# Patient Record
(demographics unavailable — no encounter records)

---

## 2025-02-10 NOTE — PHYSICAL EXAM
[Alert] : alert [No Acute Distress] : in no acute distress [Sclera] : the sclera and conjunctiva were normal [EOMI] : extraocular movements were intact [Normal Outer Ear/Nose] : the ears and nose were normal in appearance [Normal Appearance] : the appearance of the neck was normal [Supple] : the neck was supple [No Respiratory Distress] : no respiratory distress [No Acc Muscle Use] : no accessory muscle use [Respiration, Rhythm And Depth] : normal respiratory rhythm and effort [Auscultation Breath Sounds / Voice Sounds] : lungs were clear to auscultation bilaterally [Normal S1, S2] : normal S1 and S2 [Heart Rate And Rhythm] : heart rate was normal and rhythm regular [Edema] : edema was not present [Bowel Sounds] : normal bowel sounds [Abdomen Tenderness] : non-tender [Abdomen Soft] : soft [No Spinal Tenderness] : no spinal tenderness [Normal Gait] : normal gait [Involuntary Movements] : no involuntary movements were seen [Motor Tone] : muscle strength and tone were normal [Normal Color / Pigmentation] : normal skin color and pigmentation [Normal Turgor] : normal skin turgor [No Focal Deficits] : no focal deficits [Normal Affect] : the affect was normal [Normal Mood] : the mood was normal [Version 1] : Word list version 1 - Banana, Sunrise, Chair [Normal Clock Drawn, with correct arm position (2 points)] : normal clock drawn, with correct arm position (2 points) [5 Points] : 5 points [FreeTextEntry1] : No proptosis [de-identified] : Normal radial pulses

## 2025-02-10 NOTE — HISTORY OF PRESENT ILLNESS
[No falls in past year] : Patient reported no falls in the past year [Completely Independent] : Completely independent. [Smoke Detector] : smoke detector [NO] : No [0] : 1) Little interest or pleasure doing things: Not at all (0) [1] : 2) Feeling down, depressed, or hopeless for several days (1) [Patient reported hearing was normal] : Patient reported hearing was normal [Patient reported vision is abnormal] : Patient reported vision is abnormal [Patient reported Patient reported dental screening is normal] : Patient reported dental screening is normal [Patient declined colon rectal/cancer screening] : Patient declined colon/rectal cancer screening [Patient declined cervical cancer screening] : Patient declined cervical cancer screening [Patient/Caregiver unclear of wishes] : , patient/caregiver unclear of wishes [I will adhere to the patient's wishes.] : I will adhere to the patient's wishes. [Patient declined breast sonogram] : Patient declined breast sonogram [PHQ-2 Negative - No further assessment needed] : PHQ-2 Negative - No further assessment needed [I have developed a follow-up plan documented below in the note.] : I have developed a follow-up plan documented below in the note. [FreeTextEntry1] : 65-year-old F with a history of breast cancer s/p lumpectomy/RT, endometrial sarcoma s/p resection/chemo, hx of Hyperthyroidism s/p methimazole, prediabetes, and insomnia who presents here to establish care after ED visit.   #Post ED Follow up - patient presented after noting she was leaning to her right side when bending over with dizziness. She was evaluated in the ED and had no neurologic deficits and a normal CT head. She noted that with 2 days of that presentation she began having significant upper respiratory symptoms, including congestion, cough, diarrhea, nausea and emesis. She noted that she is feeling much better since Thursday and has some residual congestion.  - Since her recovery she noted that her balance issues and dizziness resolved, and she feels back to herself. She denies any sensation or strength changes.   #Multicomplexity - patient has a history of breast cancer s/p lumpectomy and RT (in 2010). Patient's most recent screening in 2022 with breast MRI. Oncology recommended annual imaging for surveillance.  - Patient with history of metastatic endometrial stromal carcinoma with prior annual CT CAP imaging for surveillance. Patient with most recent imaging in 2022. Recommended for annual. Patient had recurrence in 2015 with stereotactic radiosurgery.  - Post hysterectomy patient had recurrent small bowel obstructions (3 total). - Patient developed Graves hyperthyroidism s/p Methimazole treatment in 2016, without recurrence since. - Insomnia: patient with longstanding insomnia related her anxiety. She tried trazodone in the past which helped her get to sleep but she had early morning awakenings that resulted her in her not being able to fall back asleep due to ruminating thoughts. She notes she has not found anything that helps her stay sleep yet. Patient notes she has a beer nightly with dinner; however, with recent URI has stopped that and notices that she is staying asleep better without it.  - Patient has longstanding anxiety since she was young; has been on Zoloft in the past and Klonopin. Notes she is skeptical about medication since she had significant GI side effects with Zoloft. Notes she has a lot of anxiety around healthcare and medical procedures given her significant medical history above  ACP - patient notes she has not talked with her family about what she would want in an emergency or discussions about who she would want to be her healthcare proxy. She notes that she will talk with her sister about it and wanted a copy of the HCP form.   [BoneDensityDate] : 04/22 [ColonoscopyDate] : 2022 [FreeTextEntry3] : 2022 [FreeTextEntry6] : NA - Total abdominal hysterectomy [Driving Concerns] : not driving or driving without noted concerns [de-identified] : NA [[id number]] : 1 [AdvancecareDate] : 02/25 [FreeTextEntry4] : Patient notes that she wants to talk to her sister about what she would want and if she would be willing to be her HCP. Patient does note that she would like to minimize surveillance and screening despite her medical history.

## 2025-03-24 NOTE — HISTORY OF PRESENT ILLNESS
[FreeTextEntry1] : 65-year-old F with a history of breast cancer s/p lumpectomy/RT, endometrial sarcoma s/p resection/chemo, hx of Hyperthyroidism s/p methimazole, prediabetes, and insomnia who presents here for post hospitalization follow up after admission for SBO  #SBO - Patient was admitted at [facility] for small bowel obstruction.  Patient noted that she initially had GI symptoms consisting of nausea, abdominal pain, 5 episodes of vomiting and 4 bowel movements.  Afterwards patient stopped having bowel movements or passing gas and continued to have emesis.  Patient presented to the ED and was evaluated and found to have an SBO on CT abdomen pelvis. - Patient was admitted and given bowel rest she did not end up requiring a NG tube and was able to start a clear liquid diet with PPI then a regular diet and was cleared to go home. - Post discharge telephone call was completed on March 14 with patient noting that she was feeling overall well however had only been able to have a small bowel movement the day before. - Today patient notes that she is feeling back to her baseline and that she is back to her normal number of bowel movements.  She normally has 2-3 bowel movements a day she also notes that she is placing flatus well and no longer has any abdominal pain.  Patient denies any fevers, chills, nausea, emesis, hematochezia, diarrhea, bloating.  - Post discharge patient was recommended to follow-up with surgery.  Initially she was hesitant however discussed with patient that she notes that she does need a colonoscopy and if she could get both of those done with the same provider that would be ideal.  Patient notes that she talked with Dr. Butler who stated that she could complete her colonoscopy and also do follow-up for her recurrent small bowel obstructions.  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  #Prediabetes - Patient has a history of prediabetes with a recent A1c of 6.2.  Discussed with patient lifestyle modifications to improve prediabetes - Patient notes that she is not interested in medication at this time - She denies any numbness or tingling, chest Pain, trouble breathing, vision changes, changes in urination. - She notes that her diet has a lot of pasta and bread that she knows that she needs to work on those. - She denies any family history of diabetes.  [No falls in past year] : Patient reported no falls in the past year [Completely Independent] : Completely independent. [Smoke Detector] : smoke detector [Driving Concerns] : not driving or driving without noted concerns [de-identified] : NA [0] : 1) Little interest or pleasure doing things: Not at all (0) [1] : 2) Feeling down, depressed, or hopeless for several days (1) [PHQ-2 Negative - No further assessment needed] : PHQ-2 Negative - No further assessment needed [I have developed a follow-up plan documented below in the note.] : I have developed a follow-up plan documented below in the note. [[id number]] : 1

## 2025-07-10 NOTE — PHYSICAL EXAM
[Alert] : alert [Sclera] : the sclera and conjunctiva were normal [EOMI] : extraocular movements were intact [Normal Outer Ear/Nose] : the ears and nose were normal in appearance [Normal Appearance] : the appearance of the neck was normal [Supple] : the neck was supple [No Respiratory Distress] : no respiratory distress [No Acc Muscle Use] : no accessory muscle use [Respiration, Rhythm And Depth] : normal respiratory rhythm and effort [Auscultation Breath Sounds / Voice Sounds] : lungs were clear to auscultation bilaterally [Normal S1, S2] : normal S1 and S2 [Heart Rate And Rhythm] : heart rate was normal and rhythm regular [Edema] : edema was not present [Bowel Sounds] : normal bowel sounds [Abdomen Tenderness] : non-tender [Abdomen Soft] : soft [No Spinal Tenderness] : no spinal tenderness [Normal Gait] : normal gait [Involuntary Movements] : no involuntary movements were seen [Motor Tone] : muscle strength and tone were normal [Normal Turgor] : normal skin turgor [No Focal Deficits] : no focal deficits [Normal Affect] : the affect was normal [Normal Mood] : the mood was normal [FreeTextEntry1] : No proptosis [de-identified] : Normal radial pulses [de-identified] : Excoriations present over bilateral wrists and right neck.  No induration, edema.

## 2025-07-10 NOTE — HISTORY OF PRESENT ILLNESS
[FreeTextEntry1] : CARLITOS JUAN is a 66-year-old F with a history of breast cancer s/p lumpectomy/RT, endometrial sarcoma s/p resection/chemo, hx of Hyperthyroidism s/p methimazole, prediabetes, and insomnia who presents here for follow up visit   #Interval History   1. Patient saw nutrition and they discussed some dietary changes to help both prevent further GI issues and also to help with her prediabetes.  In addition, they mentioned that patient has had issues with sleep and anxiety and recommend that she follow-up with her PCP to discuss those.   #Multicomplexity #Prediabetes #History of recurrent SBO - Patient noted that the meeting with a dietitian was really helpful and she gave her a lot of goals for her dietary changes to prevent diabetes.  She also gave her some recommendations to limit GI distress and hopefully reduce her risk of having a recurrent SBO. - Patient notes that she has generally been feeling okay she did have a little bout of norovirus last week that was running through her workplace and had diarrhea for 3 days.  Noted that the diarrhea resolved and she stayed really hydrated with some electrolyte drinks during that time. - She noted that she has been cutting back on the [place] that she has been eating to try to limit her carbohydrates she was given a goal of 90 g of carbs by her nutritionist per her report but she says that she usually gets around 100 herself - Patient has had some weight loss since last visit current weight is 107 from previous weight of 112.  Patient notes that she normally weighs around 115 in the winter and then will lose 5 to 10 pounds in the summer as she notes that she eats less because she feels very warm and does not feel as hungry. - She notes that because of all the heat she has been drinking some juice, electrolyte drinks and some flavored water she notes that it is really hard to just drink regular water so she usually add something to it but does not like the taste of the electrolyte drinks - She also noted the nutrition recommended that she should gain some weight however she notes that she feels pretty good at her current weight and was concerned about her cholesterol if she ate too many eggs - She does note that she has been eating more Greek yogurt to increase her protein intake but she has not done Glucerna because she does not want to take in highly processed foods regularly.  #Anxiety #Life stressors - Patient notes that she has had some significant financial stressors over the past few months in terms of affordable housing with her Social Security income and her current job working at the [employer] and she notes it is very difficult to be able to find affordable housing in [place] and that everything is very expensive.  She note that she was working through the process of potentially applying for section 8 housing however she expressed feelings of being embarrassed that she would need this assistance and noted that she did not think that this is where she would be in her life at this time. - She notes that she has thought about moving to [place] where it is warmer however would be very far away from all of her family.  She notes that she has 2 sisters and 4 brothers who all live in the [place] area and she would not want to be that far away from all of them.  Noted that therapy a lot of anxiety and moving to a place like [place] where she would not know anybody. - She did noted that her sleep has actually been pretty good of late she was considering using CBD Gummies if she is having difficulty sleeping however she notes that she has been actually sleeping pretty consistently at this time and was not as concerned about her sleep anymore. - Patient notes that she is not currently seeing a therapist but has seen many therapists in her life and is not really interested in seeing one currently.  Patient also notes hesitancy about medication she does not want to be dependent on something for sleep or her mood long-term. - Patient does note that in the past she used to be on trazodone and felt like after years like she could not go to sleep without it, felt really dependent on it and did not like that.  #Poison ivy - Patient notes that her garden has a lot of poison ivy and it and she has been trimming the hedges.  She noted that she has tried to keep her covered up but started having a rash on her wrists and her neck and significant itching and has been scratching the area and is wondering if we can take a look at the lesions. - Patient has been using over-the-counter lotion with combination of calamine and diphenhydramine   #Medications  - None   #Matters Most #ACP - Goals: Patient notes that she would like to limit her healthcare interactions and needs and at this point because of everything that she has been through has a lot of health anxiety and would not like to continue screenings including breast cancer, endometrial, or other types of cancers for screening and surveillance.She expresses that she understands the risks of not screening and accepts them - HCP: Patient is still undecided about who she would like to designate as her healthcare proxy between her siblings.  She noted that she would like to think about it more and is not ready to designate that at this time. - Advanced Directives: None   #Care Team - PCP/Geriatrician: Dr Carcamo - Nutritionist: Diogo Crawley [No falls in past year] : Patient reported no falls in the past year [Completely Independent] : Completely independent. [Smoke Detector] : smoke detector [Driving Concerns] : not driving or driving without noted concerns [de-identified] : NA [0] : 1) Little interest or pleasure doing things: Not at all (0) [1] : 2) Feeling down, depressed, or hopeless for several days (1) [PHQ-2 Negative - No further assessment needed] : PHQ-2 Negative - No further assessment needed [I have developed a follow-up plan documented below in the note.] : I have developed a follow-up plan documented below in the note. [[id number]] : 1